# Patient Record
Sex: MALE | Race: WHITE | NOT HISPANIC OR LATINO | URBAN - METROPOLITAN AREA
[De-identification: names, ages, dates, MRNs, and addresses within clinical notes are randomized per-mention and may not be internally consistent; named-entity substitution may affect disease eponyms.]

---

## 2017-01-06 ENCOUNTER — EMERGENCY (EMERGENCY)
Facility: HOSPITAL | Age: 67
LOS: 1 days | Discharge: HOME | End: 2017-01-06
Attending: EMERGENCY MEDICINE | Admitting: EMERGENCY MEDICINE
Payer: MEDICARE

## 2017-01-06 VITALS
OXYGEN SATURATION: 96 % | SYSTOLIC BLOOD PRESSURE: 135 MMHG | HEART RATE: 92 BPM | DIASTOLIC BLOOD PRESSURE: 89 MMHG | RESPIRATION RATE: 16 BRPM

## 2017-01-06 VITALS
HEART RATE: 78 BPM | TEMPERATURE: 98 F | RESPIRATION RATE: 16 BRPM | DIASTOLIC BLOOD PRESSURE: 81 MMHG | OXYGEN SATURATION: 96 % | SYSTOLIC BLOOD PRESSURE: 136 MMHG

## 2017-01-06 DIAGNOSIS — S01.21XA LACERATION WITHOUT FOREIGN BODY OF NOSE, INITIAL ENCOUNTER: ICD-10-CM

## 2017-01-06 DIAGNOSIS — Z23 ENCOUNTER FOR IMMUNIZATION: ICD-10-CM
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
## 2017-01-06 DIAGNOSIS — S00.83XA CONTUSION OF OTHER PART OF HEAD, INITIAL ENCOUNTER: ICD-10-CM
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
## 2017-01-06 PROCEDURE — 70450 CT HEAD/BRAIN W/O DYE: CPT

## 2017-01-06 PROCEDURE — 12011 RPR F/E/E/N/L/M 2.5 CM/<: CPT

## 2017-01-06 PROCEDURE — 70486 CT MAXILLOFACIAL W/O DYE: CPT

## 2017-01-06 PROCEDURE — 90715 TDAP VACCINE 7 YRS/> IM: CPT

## 2017-01-06 PROCEDURE — 70486 CT MAXILLOFACIAL W/O DYE: CPT | Mod: 26

## 2017-01-06 PROCEDURE — 99284 EMERGENCY DEPT VISIT MOD MDM: CPT | Mod: 25

## 2017-01-06 PROCEDURE — 90471 IMMUNIZATION ADMIN: CPT

## 2017-01-06 PROCEDURE — 70450 CT HEAD/BRAIN W/O DYE: CPT | Mod: 26

## 2017-01-06 RX ORDER — TETANUS TOXOID, REDUCED DIPHTHERIA TOXOID AND ACELLULAR PERTUSSIS VACCINE, ADSORBED 5; 2.5; 8; 8; 2.5 [IU]/.5ML; [IU]/.5ML; UG/.5ML; UG/.5ML; UG/.5ML
0.5 SUSPENSION INTRAMUSCULAR ONCE
Qty: 0 | Refills: 0 | Status: COMPLETED | OUTPATIENT
Start: 2017-01-06 | End: 2017-01-06

## 2017-01-06 RX ADMIN — TETANUS TOXOID, REDUCED DIPHTHERIA TOXOID AND ACELLULAR PERTUSSIS VACCINE, ADSORBED 0.5 MILLILITER(S): 5; 2.5; 8; 8; 2.5 SUSPENSION INTRAMUSCULAR at 20:16

## 2017-01-06 NOTE — ED ADULT NURSE NOTE - DISCHARGE TEACHING
followup with primary doctor and in 5 days for suture removal. Return to ER sooner if symptoms worsen

## 2017-01-06 NOTE — ED PROVIDER NOTE - NEUROLOGICAL, MLM
Alert and oriented, no focal deficits, no motor or sensory deficits.  CN II-XII intact, strength 5/5 b/l upper and lower ext, sensation intact distally b/l upper and lower ext

## 2017-01-06 NOTE — ED PROVIDER NOTE - SKIN, MLM
1cm laceration to bridge of nose, with +external nose abrasions, no septal hematoma, +small hematoma to left forehead with overlying abrasion

## 2017-01-06 NOTE — ED PROVIDER NOTE - MEDICAL DECISION MAKING DETAILS
65 y/o m s/p mechanical fall with laceration to nasal bridge; lac repaired, given tetanus, CT head/facial bones negative, d/c with wound care instructions, f/u in 5 days for suture removal.

## 2017-01-06 NOTE — ED PROVIDER NOTE - OBJECTIVE STATEMENT
67 y/o m no significant pmh on baby asa presents stating tripped and fell today while walking on street, glasses cut the bridge of his nose, has an abrasion to his forehead.  Pt stating he remembers everything, didn't lose consciousness, denies blurred vision, dizziness, all other ROS negative.

## 2017-01-06 NOTE — ED ADULT NURSE NOTE - OBJECTIVE STATEMENT
Patient presents to ED s/p trip and fall. Patient reports "I was running for the bus and I tripped and fell straight on my face." Abrasion and hematoma noted to patient's forehead, laceration noted to bridge of nose. Patient denies any loss of consciousness. A&Ox3. Ambulating with steady gait. No s/s acute distress noted. Will continue to monitor.

## 2017-01-06 NOTE — ED PROCEDURE NOTE - CPROC ED POST PROC CARE GUIDE1
Instructed patient/caregiver regarding signs and symptoms of infection./Verbal/written post procedure instructions were given to patient/caregiver.

## 2017-01-06 NOTE — ED PROVIDER NOTE - ATTENDING CONTRIBUTION TO CARE
trip and fall with nasal injury/laceration.  takes asa daily so ct done and neg for bleed/fracture.  lac repair, tetanus updated

## 2017-01-06 NOTE — ED ADULT TRIAGE NOTE - CHIEF COMPLAINT QUOTE
running for bus- trip and fell on face - no LOC; bruising on face - nose ; laceration on bridge of nose where glasses rested ; glasses did not break
